# Patient Record
Sex: MALE | Race: BLACK OR AFRICAN AMERICAN | NOT HISPANIC OR LATINO | URBAN - NONMETROPOLITAN AREA
[De-identification: names, ages, dates, MRNs, and addresses within clinical notes are randomized per-mention and may not be internally consistent; named-entity substitution may affect disease eponyms.]

---

## 2024-01-24 ENCOUNTER — OFFICE VISIT (OUTPATIENT)
Dept: URBAN - NONMETROPOLITAN AREA CLINIC 2 | Facility: CLINIC | Age: 39
End: 2024-01-24
Payer: COMMERCIAL

## 2024-01-24 ENCOUNTER — LAB OUTSIDE AN ENCOUNTER (OUTPATIENT)
Dept: URBAN - NONMETROPOLITAN AREA CLINIC 2 | Facility: CLINIC | Age: 39
End: 2024-01-24

## 2024-01-24 VITALS
HEART RATE: 79 BPM | BODY MASS INDEX: 19.3 KG/M2 | WEIGHT: 166.8 LBS | SYSTOLIC BLOOD PRESSURE: 141 MMHG | DIASTOLIC BLOOD PRESSURE: 92 MMHG | HEIGHT: 78 IN

## 2024-01-24 DIAGNOSIS — K92.1 BLOODY STOOLS: ICD-10-CM

## 2024-01-24 DIAGNOSIS — R10.30 LOWER ABDOMINAL PAIN: ICD-10-CM

## 2024-01-24 DIAGNOSIS — K51.211 ULCERATIVE PROCTITIS WITH RECTAL BLEEDING: ICD-10-CM

## 2024-01-24 DIAGNOSIS — Z87.19 HISTORY OF COLITIS: ICD-10-CM

## 2024-01-24 PROBLEM — 10811000202109: Status: ACTIVE | Noted: 2024-01-24

## 2024-01-24 PROCEDURE — 99204 OFFICE O/P NEW MOD 45 MIN: CPT | Performed by: NURSE PRACTITIONER

## 2024-01-24 RX ORDER — ONDANSETRON 4 MG/1
1 TABLET ON THE TONGUE AND ALLOW TO DISSOLVE TABLET, ORALLY DISINTEGRATING ORAL ONCE A DAY
Qty: 30 | Refills: 3 | OUTPATIENT
Start: 2024-01-24

## 2024-01-24 RX ORDER — HYOSCYAMINE SULFATE 0.12 MG/1
TABLET ORAL
Qty: 20 TABLET | Status: ACTIVE | COMMUNITY

## 2024-01-24 RX ORDER — ONDANSETRON 4 MG/1
TABLET, ORALLY DISINTEGRATING ORAL
Qty: 12 TABLET | Status: ACTIVE | COMMUNITY

## 2024-01-24 RX ORDER — HYOSCYAMINE SULFATE 0.12 MG/1
1 TABLET UNDER THE TONGUE AND ALLOW TO DISSOLVE  AS NEEDED TABLET SUBLINGUAL THREE TIMES A DAY
Qty: 90 | Refills: 1 | OUTPATIENT
Start: 2024-01-24 | End: 2024-03-24

## 2024-01-24 RX ORDER — LEVETIRACETAM 500 MG/1
TAKE 1 TABLET BY MOUTH EVERY 12 HOURS TABLET, FILM COATED ORAL
Qty: 60 EACH | Refills: 2 | Status: ACTIVE | COMMUNITY

## 2024-01-24 NOTE — HPI-TODAY'S VISIT:
Mr. Garry Zarate is a 37 yo male who presents today for hospital f/u.  Patient went to [facility] ER on 1/18 with complaints of lower abd pain and bloody stools for 6 mos, bilateral leg pain and swelling x 2 days, and fever. Garry reported BRBPR TID x 6 mos, with LE weakness and fever causing him greater concern and subsequent presentation to ER. Labs notable for WBC 11.9, H/H 11.4/35.5. CT obtained showing thickening of the bowel wall in pelvis consistent with enteritis or colitis. He was discharged with Omnicef and Flagyl. Today Garry reports 6 days into his abx feeling very nauseous and not wanting to eat foods. Abdominal pain has improved but bleeding persists. No family hx of colon cancer or IBD. Reports a previous colonoscopy around 10 years ago with concerns of a mass that he states was determined to be benign, with no further follow-up. LG.

## 2024-01-25 LAB
A/G RATIO: 0.7
ALBUMIN: 4.3
ALKALINE PHOSPHATASE: 70
ALT (SGPT): 16
AST (SGOT): 29
BASO (ABSOLUTE): 0
BASOS: 0
BILIRUBIN, TOTAL: 0.3
BUN/CREATININE RATIO: 9
BUN: 9
C-REACTIVE PROTEIN, QUANT: 8
CALCIUM: 9.2
CARBON DIOXIDE, TOTAL: 18
CHLORIDE: 96
CREATININE: 1.01
EGFR: 97
EOS (ABSOLUTE): 0.1
EOS: 2
GLOBULIN, TOTAL: 5.8
GLUCOSE: 96
HEMATOCRIT: 39.6
HEMATOLOGY COMMENTS:: (no result)
HEMOGLOBIN: 12.9
IMMATURE CELLS: (no result)
IMMATURE GRANS (ABS): 0
IMMATURE GRANULOCYTES: 0
IRON BIND.CAP.(TIBC): 238
IRON SATURATION: 13
IRON: 30
LYMPHS (ABSOLUTE): 2.7
LYMPHS: 40
MCH: 26.6
MCHC: 32.6
MCV: 82
MONOCYTES(ABSOLUTE): 0.7
MONOCYTES: 11
NEUTROPHILS (ABSOLUTE): 3.2
NEUTROPHILS: 47
NRBC: (no result)
PLATELETS: 319
POTASSIUM: 4
PROTEIN, TOTAL: 10.1
RBC: 4.85
RDW: 13
SEDIMENTATION RATE-WESTERGREN: >119
SODIUM: 132
UIBC: 208
WBC: 6.8

## 2024-01-26 ENCOUNTER — TELEPHONE ENCOUNTER (OUTPATIENT)
Dept: URBAN - NONMETROPOLITAN AREA CLINIC 2 | Facility: CLINIC | Age: 39
End: 2024-01-26

## 2024-01-30 ENCOUNTER — TELEPHONE ENCOUNTER (OUTPATIENT)
Dept: URBAN - NONMETROPOLITAN AREA CLINIC 2 | Facility: CLINIC | Age: 39
End: 2024-01-30

## 2024-04-16 ENCOUNTER — COLON (OUTPATIENT)
Dept: URBAN - NONMETROPOLITAN AREA SURGERY CENTER 1 | Facility: SURGERY CENTER | Age: 39
End: 2024-04-16
Payer: COMMERCIAL

## 2024-04-16 DIAGNOSIS — K62.89 OTHER SPECIFIED DISEASES OF ANUS AND RECTUM: ICD-10-CM

## 2024-04-16 DIAGNOSIS — K62.5 ANAL BLEEDING: ICD-10-CM

## 2024-04-16 DIAGNOSIS — R19.7 ACUTE DIARRHEA: ICD-10-CM

## 2024-04-16 DIAGNOSIS — D49.0 NEOPLASM OF UNSPECIFIED BEHAVIOR OF DIGESTIVE SYSTEM: ICD-10-CM

## 2024-04-16 DIAGNOSIS — K62.6 ANAL ULCER: ICD-10-CM

## 2024-04-16 PROCEDURE — 45380 COLONOSCOPY AND BIOPSY: CPT | Performed by: INTERNAL MEDICINE

## 2024-04-16 RX ORDER — ONDANSETRON 4 MG/1
1 TABLET ON THE TONGUE AND ALLOW TO DISSOLVE TABLET, ORALLY DISINTEGRATING ORAL ONCE A DAY
Qty: 30 | Refills: 3 | Status: ACTIVE | COMMUNITY
Start: 2024-01-24

## 2024-04-16 RX ORDER — HYOSCYAMINE SULFATE 0.12 MG/1
TABLET ORAL
Qty: 20 TABLET | Status: ACTIVE | COMMUNITY

## 2024-04-16 RX ORDER — ONDANSETRON 4 MG/1
TABLET, ORALLY DISINTEGRATING ORAL
Qty: 12 TABLET | Status: ACTIVE | COMMUNITY

## 2024-04-16 RX ORDER — LEVETIRACETAM 500 MG/1
TAKE 1 TABLET BY MOUTH EVERY 12 HOURS TABLET, FILM COATED ORAL
Qty: 60 EACH | Refills: 2 | Status: ACTIVE | COMMUNITY

## 2024-05-01 ENCOUNTER — TELEPHONE ENCOUNTER (OUTPATIENT)
Dept: URBAN - NONMETROPOLITAN AREA CLINIC 2 | Facility: CLINIC | Age: 39
End: 2024-05-01

## 2024-05-08 ENCOUNTER — TELEPHONE ENCOUNTER (OUTPATIENT)
Dept: URBAN - NONMETROPOLITAN AREA CLINIC 2 | Facility: CLINIC | Age: 39
End: 2024-05-08

## 2024-05-08 ENCOUNTER — OFFICE VISIT (OUTPATIENT)
Dept: URBAN - NONMETROPOLITAN AREA CLINIC 2 | Facility: CLINIC | Age: 39
End: 2024-05-08
Payer: COMMERCIAL

## 2024-05-08 VITALS
HEART RATE: 80 BPM | BODY MASS INDEX: 19.83 KG/M2 | HEIGHT: 78 IN | DIASTOLIC BLOOD PRESSURE: 93 MMHG | SYSTOLIC BLOOD PRESSURE: 148 MMHG | WEIGHT: 171.4 LBS

## 2024-05-08 DIAGNOSIS — K51.211 ULCERATIVE PROCTITIS WITH RECTAL BLEEDING: ICD-10-CM

## 2024-05-08 PROCEDURE — 99214 OFFICE O/P EST MOD 30 MIN: CPT | Performed by: INTERNAL MEDICINE

## 2024-05-08 RX ORDER — LEVETIRACETAM 500 MG/1
TAKE 1 TABLET BY MOUTH EVERY 12 HOURS TABLET, FILM COATED ORAL
Qty: 60 EACH | Refills: 2 | Status: ACTIVE | COMMUNITY

## 2024-05-08 RX ORDER — ADALIMUMAB 80MG/0.8ML
160MG DAY 0 AND 80MG DAY 14 KIT SUBCUTANEOUS EVERY 2 WEEKS
Qty: 3 | Refills: 0 | OUTPATIENT
Start: 2024-05-08 | End: 2024-06-05

## 2024-05-08 RX ORDER — HYOSCYAMINE SULFATE 0.12 MG/1
TABLET ORAL
Qty: 20 TABLET | Status: ACTIVE | COMMUNITY

## 2024-05-08 RX ORDER — PREDNISONE 10 MG/1
4 TABLETS ONCE A DAY FOR 14 DAYS, 3 TABLETS ONCE A DAY FOR 7 DAYS, 2 TABLETS ONCE A DAY FOR 7 DAYS, 1 TABLET ONCE A DAY FOR 7 DAYS TABLET ORAL
Qty: 98 TABLET | Refills: 0 | Status: ACTIVE | COMMUNITY
Start: 2024-04-23 | End: 2024-05-28

## 2024-05-08 RX ORDER — ADALIMUMAB 40MG/0.4ML
40MG EVERY 2 WEEKS KIT SUBCUTANEOUS EVERY 2 WEEKS
Qty: 2 | Refills: 11 | OUTPATIENT
Start: 2024-05-08 | End: 2025-04-09

## 2024-05-08 RX ORDER — MESALAMINE 1000 MG/1
1 SUPPOSITORY AT BEDTIME SUPPOSITORY RECTAL ONCE A DAY
Qty: 30 | Refills: 5 | Status: ACTIVE | COMMUNITY
Start: 2024-04-23 | End: 2024-10-20

## 2024-05-08 RX ORDER — PREDNISONE 20 MG/1
2 TABLETS TABLET ORAL ONCE A DAY
Qty: 28 TABLET | Refills: 1 | OUTPATIENT
Start: 2024-05-08 | End: 2024-06-05

## 2024-05-08 RX ORDER — ONDANSETRON 4 MG/1
1 TABLET ON THE TONGUE AND ALLOW TO DISSOLVE TABLET, ORALLY DISINTEGRATING ORAL ONCE A DAY
Qty: 30 | Refills: 3 | Status: ACTIVE | COMMUNITY
Start: 2024-01-24

## 2024-05-08 RX ORDER — ONDANSETRON 4 MG/1
TABLET, ORALLY DISINTEGRATING ORAL
Qty: 12 TABLET | Status: ACTIVE | COMMUNITY

## 2024-05-09 ENCOUNTER — TELEPHONE ENCOUNTER (OUTPATIENT)
Dept: URBAN - NONMETROPOLITAN AREA CLINIC 13 | Facility: CLINIC | Age: 39
End: 2024-05-09

## 2024-05-09 ENCOUNTER — DASHBOARD ENCOUNTERS (OUTPATIENT)
Age: 39
End: 2024-05-09

## 2024-05-09 RX ORDER — ADALIMUMAB-ADAZ 40 MG/.4ML
ONE INJECTION INJECTION, SOLUTION SUBCUTANEOUS EVERY 2 WEEKS
Qty: 2 | Refills: 11 | OUTPATIENT
Start: 2024-05-09 | End: 2025-04-10

## 2024-05-21 ENCOUNTER — TELEPHONE ENCOUNTER (OUTPATIENT)
Dept: URBAN - NONMETROPOLITAN AREA CLINIC 13 | Facility: CLINIC | Age: 39
End: 2024-05-21

## 2024-05-22 ENCOUNTER — TELEPHONE ENCOUNTER (OUTPATIENT)
Dept: URBAN - NONMETROPOLITAN AREA CLINIC 2 | Facility: CLINIC | Age: 39
End: 2024-05-22

## 2024-05-22 RX ORDER — PREDNISONE 20 MG/1
1 TABLET TABLET ORAL ONCE A DAY
Qty: 30 | Refills: 3 | OUTPATIENT
Start: 2024-05-22 | End: 2024-09-19

## 2024-05-31 ENCOUNTER — TELEPHONE ENCOUNTER (OUTPATIENT)
Dept: URBAN - NONMETROPOLITAN AREA CLINIC 2 | Facility: CLINIC | Age: 39
End: 2024-05-31

## 2024-05-31 PROBLEM — 111891008: Status: ACTIVE | Noted: 2024-05-31

## 2024-05-31 LAB
A/G RATIO: 0.8
ALBUMIN: 3.9
ALKALINE PHOSPHATASE: 77
ALT (SGPT): 13
AST (SGOT): 14
BASO (ABSOLUTE): 0
BASOS: 0
BILIRUBIN, TOTAL: 0.3
BUN/CREATININE RATIO: 10
BUN: 9
C-REACTIVE PROTEIN, QUANT: 8
CALCIUM: 9.6
CARBON DIOXIDE, TOTAL: 22
CHLORIDE: 101
CREATININE: 0.87
EGFR: 113
EOS (ABSOLUTE): 0
EOS: 0
GLOBULIN, TOTAL: 4.8
GLUCOSE: 82
HBSAG SCREEN: NEGATIVE
HEMATOCRIT: 45.7
HEMATOLOGY COMMENTS:: (no result)
HEMOGLOBIN: 14.3
HEP B CORE AB, TOT: POSITIVE
HEPATITIS B SURF AB QUANT: <3.5
IMMATURE CELLS: (no result)
IMMATURE GRANS (ABS): 0
IMMATURE GRANULOCYTES: 0
LYMPHS (ABSOLUTE): 3.3
LYMPHS: 46
MCH: 26.6
MCHC: 31.3
MCV: 85
MONOCYTES(ABSOLUTE): 0.5
MONOCYTES: 7
NEUTROPHILS (ABSOLUTE): 3.3
NEUTROPHILS: 47
NRBC: (no result)
PLATELETS: 230
POTASSIUM: 3.7
PROTEIN, TOTAL: 8.7
QUANTIFERON CRITERIA: (no result)
QUANTIFERON INCUBATION: (no result)
QUANTIFERON MITOGEN VALUE: >10
QUANTIFERON NIL VALUE: 0.54
QUANTIFERON TB1 AG VALUE: 0.41
QUANTIFERON TB2 AG VALUE: 0.35
QUANTIFERON-TB GOLD PLUS: NEGATIVE
RBC: 5.37
RDW: 13.5
SEDIMENTATION RATE-WESTERGREN: 105
SODIUM: 137
WBC: 7.1

## 2024-06-05 ENCOUNTER — OFFICE VISIT (OUTPATIENT)
Dept: URBAN - NONMETROPOLITAN AREA CLINIC 2 | Facility: CLINIC | Age: 39
End: 2024-06-05

## 2024-06-06 ENCOUNTER — TELEPHONE ENCOUNTER (OUTPATIENT)
Dept: URBAN - NONMETROPOLITAN AREA CLINIC 13 | Facility: CLINIC | Age: 39
End: 2024-06-06

## 2024-06-06 RX ORDER — ADALIMUMAB-ADAZ 40 MG/.4ML
ONE INJECTION INJECTION, SOLUTION SUBCUTANEOUS EVERY 2 WEEKS
Qty: 2 | Refills: 11
Start: 2024-05-09 | End: 2025-05-08

## 2024-06-21 ENCOUNTER — TELEPHONE ENCOUNTER (OUTPATIENT)
Dept: URBAN - NONMETROPOLITAN AREA CLINIC 2 | Facility: CLINIC | Age: 39
End: 2024-06-21

## 2024-06-27 ENCOUNTER — TELEPHONE ENCOUNTER (OUTPATIENT)
Dept: URBAN - NONMETROPOLITAN AREA CLINIC 2 | Facility: CLINIC | Age: 39
End: 2024-06-27

## 2024-06-27 PROBLEM — 61977001: Status: ACTIVE | Noted: 2024-06-27

## 2024-06-27 RX ORDER — BUDESONIDE 3 MG/1
3 CAPSULE CAPSULE ORAL ONCE A DAY
Qty: 90 CAPSULE | Refills: 3 | OUTPATIENT
Start: 2024-06-28

## 2024-06-27 RX ORDER — ADALIMUMAB-ADAZ 40 MG/.4ML
40MG INJECTION, SOLUTION SUBCUTANEOUS EVERY 2 WEEKS
Qty: 2 | Refills: 11 | OUTPATIENT
Start: 2024-06-27 | End: 2025-05-29

## 2024-06-27 RX ORDER — TENOFOVIR ALAFENAMIDE 25 MG/1
1 TABLET WITH FOOD TABLET ORAL ONCE A DAY
Qty: 30 TABLET | Refills: 11 | OUTPATIENT
Start: 2024-06-27 | End: 2025-06-22

## 2024-07-02 ENCOUNTER — TELEPHONE ENCOUNTER (OUTPATIENT)
Dept: URBAN - METROPOLITAN AREA CLINIC 80 | Facility: CLINIC | Age: 39
End: 2024-07-02

## 2024-07-02 ENCOUNTER — TELEPHONE ENCOUNTER (OUTPATIENT)
Dept: URBAN - NONMETROPOLITAN AREA CLINIC 2 | Facility: CLINIC | Age: 39
End: 2024-07-02

## 2024-07-10 ENCOUNTER — TELEPHONE ENCOUNTER (OUTPATIENT)
Dept: URBAN - NONMETROPOLITAN AREA CLINIC 2 | Facility: CLINIC | Age: 39
End: 2024-07-10

## 2024-07-12 ENCOUNTER — TELEPHONE ENCOUNTER (OUTPATIENT)
Dept: URBAN - NONMETROPOLITAN AREA CLINIC 2 | Facility: CLINIC | Age: 39
End: 2024-07-12

## 2024-07-12 ENCOUNTER — OFFICE VISIT (OUTPATIENT)
Dept: URBAN - NONMETROPOLITAN AREA CLINIC 2 | Facility: CLINIC | Age: 39
End: 2024-07-12
Payer: COMMERCIAL

## 2024-07-12 VITALS
HEIGHT: 78 IN | BODY MASS INDEX: 20.71 KG/M2 | SYSTOLIC BLOOD PRESSURE: 166 MMHG | HEART RATE: 72 BPM | DIASTOLIC BLOOD PRESSURE: 95 MMHG | WEIGHT: 179 LBS

## 2024-07-12 DIAGNOSIS — K51.211 ULCERATIVE PROCTITIS WITH RECTAL BLEEDING: ICD-10-CM

## 2024-07-12 DIAGNOSIS — K64.4 EXTERNAL HEMORRHOIDS: ICD-10-CM

## 2024-07-12 DIAGNOSIS — B18.1 CHRONIC HEPATITIS B: ICD-10-CM

## 2024-07-12 PROCEDURE — 99214 OFFICE O/P EST MOD 30 MIN: CPT | Performed by: NURSE PRACTITIONER

## 2024-07-12 RX ORDER — HYOSCYAMINE SULFATE 0.12 MG/1
TABLET ORAL
Qty: 20 TABLET | Status: ACTIVE | COMMUNITY

## 2024-07-12 RX ORDER — TENOFOVIR ALAFENAMIDE 25 MG/1
1 TABLET WITH FOOD TABLET ORAL ONCE A DAY
Qty: 30 TABLET | Refills: 11 | Status: ACTIVE | COMMUNITY
Start: 2024-06-27 | End: 2025-06-22

## 2024-07-12 RX ORDER — BUDESONIDE 3 MG/1
3 CAPSULE CAPSULE ORAL ONCE A DAY
Qty: 90 CAPSULE | Refills: 3 | Status: ACTIVE | COMMUNITY
Start: 2024-06-28

## 2024-07-12 RX ORDER — PREDNISONE 20 MG/1
1 TABLET TABLET ORAL ONCE A DAY
Qty: 30 | Refills: 3 | Status: ACTIVE | COMMUNITY
Start: 2024-05-22 | End: 2024-09-19

## 2024-07-12 RX ORDER — LIDOCAINE 50 MG/G
1 APPLICATION AS NEEDED CREAM RECTAL
Qty: 1 TUBE | Refills: 3 | OUTPATIENT
Start: 2024-07-12 | End: 2024-09-06

## 2024-07-12 RX ORDER — HYDROCORTISONE 25 MG/G
1 APPLICATION CREAM TOPICAL TWICE A DAY
Qty: 1 CANISTER | Refills: 3 | OUTPATIENT
Start: 2024-07-12 | End: 2024-09-06

## 2024-07-12 RX ORDER — ADALIMUMAB 40MG/0.4ML
40MG EVERY 2 WEEKS KIT SUBCUTANEOUS EVERY 2 WEEKS
Qty: 2 | Refills: 11 | Status: ACTIVE | COMMUNITY
Start: 2024-05-08 | End: 2025-04-09

## 2024-07-12 RX ORDER — ADALIMUMAB-ADAZ 40 MG/.4ML
40MG INJECTION, SOLUTION SUBCUTANEOUS EVERY 2 WEEKS
Qty: 2 | Refills: 11 | Status: ACTIVE | COMMUNITY
Start: 2024-06-27 | End: 2025-05-29

## 2024-07-12 RX ORDER — LEVETIRACETAM 500 MG/1
TAKE 1 TABLET BY MOUTH EVERY 12 HOURS TABLET, FILM COATED ORAL
Qty: 60 EACH | Refills: 2 | Status: ACTIVE | COMMUNITY

## 2024-07-12 RX ORDER — ADALIMUMAB-ADAZ 40 MG/.4ML
ONE INJECTION INJECTION, SOLUTION SUBCUTANEOUS EVERY 2 WEEKS
Qty: 2 | Refills: 11 | Status: ACTIVE | COMMUNITY
Start: 2024-05-09 | End: 2025-05-08

## 2024-07-12 RX ORDER — DICYCLOMINE HYDROCHLORIDE 10 MG/1
1 CAPSULES CAPSULE ORAL THREE TIMES A DAY
Qty: 90 CAPSULE | Refills: 3 | OUTPATIENT
Start: 2024-07-12 | End: 2024-11-08

## 2024-07-12 RX ORDER — ONDANSETRON 4 MG/1
1 TABLET ON THE TONGUE AND ALLOW TO DISSOLVE TABLET, ORALLY DISINTEGRATING ORAL ONCE A DAY
Qty: 30 | Refills: 3 | Status: ACTIVE | COMMUNITY
Start: 2024-01-24

## 2024-07-12 RX ORDER — MESALAMINE 1000 MG/1
1 SUPPOSITORY AT BEDTIME SUPPOSITORY RECTAL ONCE A DAY
Qty: 30 | Refills: 5 | Status: ACTIVE | COMMUNITY
Start: 2024-04-23 | End: 2024-10-20

## 2024-07-12 RX ORDER — ADALIMUMAB-ADAZ 40 MG/.4ML
40MG INJECTION, SOLUTION SUBCUTANEOUS EVERY 2 WEEKS
Qty: 2 | Refills: 11 | OUTPATIENT

## 2024-07-12 RX ORDER — ONDANSETRON 4 MG/1
TABLET, ORALLY DISINTEGRATING ORAL
Qty: 12 TABLET | Status: ACTIVE | COMMUNITY

## 2024-07-12 RX ORDER — TENOFOVIR ALAFENAMIDE 25 MG/1
1 TABLET WITH FOOD TABLET ORAL ONCE A DAY
Qty: 30 TABLET | Refills: 11 | OUTPATIENT

## 2024-07-12 NOTE — HPI-TODAY'S VISIT:
1/29/24: Mr. Garry Zarate is a 39 yo male who presents today for hospital f/u.  Patient went to [facility] ER on 1/18 with complaints of lower abd pain and bloody stools for 6 mos, bilateral leg pain and swelling x 2 days, and fever. Garry reported BRBPR TID x 6 mos, with LE weakness and fever causing him greater concern and subsequent presentation to ER. Labs notable for WBC 11.9, H/H 11.4/35.5. CT obtained showing thickening of the bowel wall in pelvis consistent with enteritis or colitis. He was discharged with Omnicef and Flagyl. Today Garry reports 6 days into his abx feeling very nauseous and not wanting to eat foods. Abdominal pain has improved but bleeding persists. No family hx of colon cancer or IBD. Reports a previous colonoscopy around 10 years ago with concerns of a mass that he states was determined to be benign, with no further follow-up. LG.  7/12/24: Mr. Zarate returns for follow-up of weight loss, abdominal pain, and rectal bleeding.  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  Since his last clinic visit, he had a colonoscopy with severe proctitis, biopsies showed severe inflammation and granulation tissue.  He was placed on prednisone and mesalamine suppositories a few weeks ago.  Fortunately, he has noticed a big difference on the steroids with less bleeding and some weight gain.  He is moving his bowels 4-5x daily and this is a big improvement.  He has been taking his mesalamine suppositories by mouth.  he is c/o weight tenesmus though and rectal pain. sb

## 2024-07-16 ENCOUNTER — TELEPHONE ENCOUNTER (OUTPATIENT)
Dept: URBAN - NONMETROPOLITAN AREA CLINIC 2 | Facility: CLINIC | Age: 39
End: 2024-07-16

## 2024-07-17 ENCOUNTER — TELEPHONE ENCOUNTER (OUTPATIENT)
Dept: URBAN - NONMETROPOLITAN AREA CLINIC 2 | Facility: CLINIC | Age: 39
End: 2024-07-17

## 2024-07-22 ENCOUNTER — TELEPHONE ENCOUNTER (OUTPATIENT)
Dept: URBAN - NONMETROPOLITAN AREA CLINIC 2 | Facility: CLINIC | Age: 39
End: 2024-07-22

## 2024-07-26 ENCOUNTER — TELEPHONE ENCOUNTER (OUTPATIENT)
Dept: URBAN - NONMETROPOLITAN AREA CLINIC 2 | Facility: CLINIC | Age: 39
End: 2024-07-26

## 2024-08-02 ENCOUNTER — OFFICE VISIT (OUTPATIENT)
Dept: URBAN - NONMETROPOLITAN AREA CLINIC 2 | Facility: CLINIC | Age: 39
End: 2024-08-02
Payer: COMMERCIAL

## 2024-08-02 VITALS — HEIGHT: 78 IN

## 2024-08-02 DIAGNOSIS — K64.4 EXTERNAL HEMORRHOIDS: ICD-10-CM

## 2024-08-02 DIAGNOSIS — K51.211 ULCERATIVE PROCTITIS WITH RECTAL BLEEDING: ICD-10-CM

## 2024-08-02 DIAGNOSIS — B18.1 CHRONIC HEPATITIS B: ICD-10-CM

## 2024-08-02 PROCEDURE — 99213 OFFICE O/P EST LOW 20 MIN: CPT | Performed by: NURSE PRACTITIONER

## 2024-08-02 RX ORDER — ADALIMUMAB-ADAZ 40 MG/.4ML
ONE INJECTION INJECTION, SOLUTION SUBCUTANEOUS EVERY 2 WEEKS
Qty: 2 | Refills: 11 | Status: ACTIVE | COMMUNITY
Start: 2024-05-09 | End: 2025-05-08

## 2024-08-02 RX ORDER — HYDROCORTISONE 25 MG/G
1 APPLICATION CREAM TOPICAL TWICE A DAY
Qty: 1 CANISTER | Refills: 3 | Status: ACTIVE | COMMUNITY
Start: 2024-07-12 | End: 2024-09-06

## 2024-08-02 RX ORDER — TENOFOVIR ALAFENAMIDE 25 MG/1
1 TABLET WITH FOOD TABLET ORAL ONCE A DAY
Qty: 30 TABLET | Refills: 11 | OUTPATIENT

## 2024-08-02 RX ORDER — LIDOCAINE 50 MG/G
1 APPLICATION AS NEEDED CREAM RECTAL
Qty: 1 TUBE | Refills: 3 | Status: ACTIVE | COMMUNITY
Start: 2024-07-12 | End: 2024-09-06

## 2024-08-02 RX ORDER — BUDESONIDE 3 MG/1
3 CAPSULE CAPSULE ORAL ONCE A DAY
Qty: 90 CAPSULE | Refills: 3 | Status: ACTIVE | COMMUNITY
Start: 2024-06-28

## 2024-08-02 RX ORDER — LIDOCAINE 50 MG/G
1 APPLICATION AS NEEDED CREAM RECTAL
Qty: 1 TUBE | Refills: 3 | OUTPATIENT

## 2024-08-02 RX ORDER — ONDANSETRON 4 MG/1
1 TABLET ON THE TONGUE AND ALLOW TO DISSOLVE TABLET, ORALLY DISINTEGRATING ORAL ONCE A DAY
Qty: 30 | Refills: 3 | Status: ACTIVE | COMMUNITY
Start: 2024-01-24

## 2024-08-02 RX ORDER — ADALIMUMAB 40MG/0.4ML
40MG EVERY 2 WEEKS KIT SUBCUTANEOUS EVERY 2 WEEKS
Qty: 2 | Refills: 11 | Status: ACTIVE | COMMUNITY
Start: 2024-05-08 | End: 2025-04-09

## 2024-08-02 RX ORDER — HYDROCORTISONE 25 MG/G
1 APPLICATION CREAM TOPICAL TWICE A DAY
Qty: 1 CANISTER | Refills: 3 | OUTPATIENT

## 2024-08-02 RX ORDER — HYOSCYAMINE SULFATE 0.12 MG/1
TABLET ORAL
Qty: 20 TABLET | Status: ACTIVE | COMMUNITY

## 2024-08-02 RX ORDER — DICYCLOMINE HYDROCHLORIDE 10 MG/1
1 CAPSULES CAPSULE ORAL THREE TIMES A DAY
Qty: 90 CAPSULE | Refills: 3 | Status: ACTIVE | COMMUNITY
Start: 2024-07-12 | End: 2024-11-08

## 2024-08-02 RX ORDER — MESALAMINE 1000 MG/1
1 SUPPOSITORY AT BEDTIME SUPPOSITORY RECTAL ONCE A DAY
Qty: 30 | Refills: 5 | Status: ACTIVE | COMMUNITY
Start: 2024-04-23 | End: 2024-10-20

## 2024-08-02 RX ORDER — ADALIMUMAB-ADAZ 40 MG/.4ML
40MG INJECTION, SOLUTION SUBCUTANEOUS EVERY 2 WEEKS
Qty: 2 | Refills: 11 | OUTPATIENT

## 2024-08-02 RX ORDER — PREDNISONE 20 MG/1
1 TABLET TABLET ORAL ONCE A DAY
Qty: 30 | Refills: 3 | Status: ACTIVE | COMMUNITY
Start: 2024-05-22 | End: 2024-09-19

## 2024-08-02 RX ORDER — ONDANSETRON 4 MG/1
TABLET, ORALLY DISINTEGRATING ORAL
Qty: 12 TABLET | Status: ACTIVE | COMMUNITY

## 2024-08-02 RX ORDER — TENOFOVIR ALAFENAMIDE 25 MG/1
1 TABLET WITH FOOD TABLET ORAL ONCE A DAY
Qty: 30 TABLET | Refills: 11 | Status: ACTIVE | COMMUNITY

## 2024-08-02 RX ORDER — ADALIMUMAB-ADAZ 40 MG/.4ML
40MG INJECTION, SOLUTION SUBCUTANEOUS EVERY 2 WEEKS
Qty: 2 | Refills: 11 | Status: ACTIVE | COMMUNITY

## 2024-08-02 RX ORDER — BUDESONIDE 3 MG/1
3 CAPSULES CAPSULE, DELAYED RELEASE PELLETS ORAL ONCE A DAY
Qty: 90 CAPSULE | Refills: 3 | OUTPATIENT
Start: 2024-08-02

## 2024-08-02 RX ORDER — LEVETIRACETAM 500 MG/1
TAKE 1 TABLET BY MOUTH EVERY 12 HOURS TABLET, FILM COATED ORAL
Qty: 60 EACH | Refills: 2 | Status: ACTIVE | COMMUNITY

## 2024-08-02 RX ORDER — DICYCLOMINE HYDROCHLORIDE 10 MG/1
1 CAPSULES CAPSULE ORAL THREE TIMES A DAY
Qty: 90 CAPSULE | Refills: 3 | OUTPATIENT

## 2024-08-02 NOTE — HPI-TODAY'S VISIT:
Garry is a pleasant 39-year-old male he returns assistance with [employer] n[employer]. Injection given with no issue.  Sb

## 2024-08-16 ENCOUNTER — OFFICE VISIT (OUTPATIENT)
Dept: URBAN - NONMETROPOLITAN AREA CLINIC 2 | Facility: CLINIC | Age: 39
End: 2024-08-16
Payer: COMMERCIAL

## 2024-08-16 VITALS
BODY MASS INDEX: 20.64 KG/M2 | DIASTOLIC BLOOD PRESSURE: 106 MMHG | HEART RATE: 68 BPM | HEIGHT: 78 IN | SYSTOLIC BLOOD PRESSURE: 178 MMHG | WEIGHT: 178.4 LBS | OXYGEN SATURATION: 99 %

## 2024-08-16 DIAGNOSIS — K51.211 ULCERATIVE PROCTITIS WITH RECTAL BLEEDING: ICD-10-CM

## 2024-08-16 DIAGNOSIS — K64.4 EXTERNAL HEMORRHOIDS: ICD-10-CM

## 2024-08-16 DIAGNOSIS — B18.1 CHRONIC HEPATITIS B: ICD-10-CM

## 2024-08-16 PROCEDURE — 99214 OFFICE O/P EST MOD 30 MIN: CPT | Performed by: NURSE PRACTITIONER

## 2024-08-16 RX ORDER — DICYCLOMINE HYDROCHLORIDE 10 MG/1
1 CAPSULES CAPSULE ORAL THREE TIMES A DAY
Qty: 90 CAPSULE | Refills: 3 | Status: ACTIVE | COMMUNITY

## 2024-08-16 RX ORDER — HYOSCYAMINE SULFATE 0.12 MG/1
TABLET ORAL
Qty: 20 TABLET | Status: ACTIVE | COMMUNITY

## 2024-08-16 RX ORDER — ADALIMUMAB-ADAZ 40 MG/.4ML
40MG INJECTION, SOLUTION SUBCUTANEOUS EVERY 2 WEEKS
Qty: 2 | Refills: 11 | OUTPATIENT

## 2024-08-16 RX ORDER — BUDESONIDE 3 MG/1
3 CAPSULES CAPSULE, DELAYED RELEASE PELLETS ORAL ONCE A DAY
Qty: 90 CAPSULE | Refills: 3 | OUTPATIENT

## 2024-08-16 RX ORDER — TENOFOVIR ALAFENAMIDE 25 MG/1
1 TABLET WITH FOOD TABLET ORAL ONCE A DAY
Qty: 30 TABLET | Refills: 11 | OUTPATIENT

## 2024-08-16 RX ORDER — LEVETIRACETAM 500 MG/1
TAKE 1 TABLET BY MOUTH EVERY 12 HOURS TABLET, FILM COATED ORAL
Qty: 60 EACH | Refills: 2 | Status: ACTIVE | COMMUNITY

## 2024-08-16 RX ORDER — ADALIMUMAB-ADAZ 40 MG/.4ML
ONE INJECTION INJECTION, SOLUTION SUBCUTANEOUS EVERY 2 WEEKS
Qty: 2 | Refills: 11 | Status: ACTIVE | COMMUNITY
Start: 2024-05-09 | End: 2025-05-08

## 2024-08-16 RX ORDER — DICYCLOMINE HYDROCHLORIDE 10 MG/1
1 CAPSULES CAPSULE ORAL THREE TIMES A DAY
Qty: 90 CAPSULE | Refills: 3 | OUTPATIENT

## 2024-08-16 RX ORDER — TENOFOVIR ALAFENAMIDE 25 MG/1
1 TABLET WITH FOOD TABLET ORAL ONCE A DAY
Qty: 30 TABLET | Refills: 11 | Status: ACTIVE | COMMUNITY

## 2024-08-16 RX ORDER — BUDESONIDE 3 MG/1
3 CAPSULES CAPSULE, DELAYED RELEASE PELLETS ORAL ONCE A DAY
Qty: 90 CAPSULE | Refills: 3 | Status: ACTIVE | COMMUNITY
Start: 2024-08-02

## 2024-08-16 RX ORDER — ADALIMUMAB 40MG/0.4ML
40MG EVERY 2 WEEKS KIT SUBCUTANEOUS EVERY 2 WEEKS
Qty: 2 | Refills: 11 | Status: ACTIVE | COMMUNITY
Start: 2024-05-08 | End: 2025-04-09

## 2024-08-16 RX ORDER — ONDANSETRON 4 MG/1
1 TABLET ON THE TONGUE AND ALLOW TO DISSOLVE TABLET, ORALLY DISINTEGRATING ORAL ONCE A DAY
Qty: 30 | Refills: 3 | Status: ACTIVE | COMMUNITY
Start: 2024-01-24

## 2024-08-16 RX ORDER — ADALIMUMAB-ADAZ 40 MG/.4ML
40MG INJECTION, SOLUTION SUBCUTANEOUS EVERY 2 WEEKS
Qty: 2 | Refills: 11 | Status: ACTIVE | COMMUNITY

## 2024-08-16 RX ORDER — LIDOCAINE 50 MG/G
1 APPLICATION AS NEEDED CREAM RECTAL
Qty: 1 TUBE | Refills: 3 | Status: ACTIVE | COMMUNITY

## 2024-08-16 RX ORDER — BUDESONIDE 3 MG/1
3 CAPSULE CAPSULE ORAL ONCE A DAY
Qty: 90 CAPSULE | Refills: 3 | Status: ACTIVE | COMMUNITY
Start: 2024-06-28

## 2024-08-16 RX ORDER — HYDROCORTISONE 25 MG/G
1 APPLICATION CREAM TOPICAL TWICE A DAY
Qty: 1 CANISTER | Refills: 3 | Status: ACTIVE | COMMUNITY

## 2024-08-16 RX ORDER — HYDROCORTISONE ACETATE 25 MG/1
1 SUPPOSITORY SUPPOSITORY RECTAL ONCE A DAY
Qty: 14 SUPPOSITORY | Refills: 2 | OUTPATIENT
Start: 2024-08-16 | End: 2024-09-27

## 2024-08-16 RX ORDER — PREDNISONE 20 MG/1
1 TABLET TABLET ORAL ONCE A DAY
Qty: 30 | Refills: 3 | Status: ACTIVE | COMMUNITY
Start: 2024-05-22 | End: 2024-09-19

## 2024-08-16 RX ORDER — ONDANSETRON 4 MG/1
TABLET, ORALLY DISINTEGRATING ORAL
Qty: 12 TABLET | Status: ACTIVE | COMMUNITY

## 2024-08-16 RX ORDER — MESALAMINE 1000 MG/1
1 SUPPOSITORY AT BEDTIME SUPPOSITORY RECTAL ONCE A DAY
Qty: 30 | Refills: 5 | Status: ACTIVE | COMMUNITY
Start: 2024-04-23 | End: 2024-10-20

## 2024-08-16 NOTE — HPI-TODAY'S VISIT:
Garry is a pleasant 39-year-old male he returns assistance with [employer] njection. Injection given with no issue.  Sb

## 2024-08-21 ENCOUNTER — OFFICE VISIT (OUTPATIENT)
Dept: URBAN - NONMETROPOLITAN AREA CLINIC 2 | Facility: CLINIC | Age: 39
End: 2024-08-21

## 2024-08-21 RX ORDER — TENOFOVIR ALAFENAMIDE 25 MG/1
1 TABLET WITH FOOD TABLET ORAL ONCE A DAY
Qty: 30 TABLET | Refills: 11 | Status: ACTIVE | COMMUNITY

## 2024-08-21 RX ORDER — HYOSCYAMINE SULFATE 0.12 MG/1
TABLET ORAL
Qty: 20 TABLET | Status: ACTIVE | COMMUNITY

## 2024-08-21 RX ORDER — BUDESONIDE 3 MG/1
3 CAPSULES CAPSULE, DELAYED RELEASE PELLETS ORAL ONCE A DAY
Qty: 90 CAPSULE | Refills: 3 | Status: ACTIVE | COMMUNITY

## 2024-08-21 RX ORDER — HYDROCORTISONE 25 MG/G
1 APPLICATION CREAM TOPICAL TWICE A DAY
Qty: 1 CANISTER | Refills: 3 | Status: ACTIVE | COMMUNITY

## 2024-08-21 RX ORDER — ADALIMUMAB 40MG/0.4ML
40MG EVERY 2 WEEKS KIT SUBCUTANEOUS EVERY 2 WEEKS
Qty: 2 | Refills: 11 | Status: ACTIVE | COMMUNITY
Start: 2024-05-08 | End: 2025-04-09

## 2024-08-21 RX ORDER — DICYCLOMINE HYDROCHLORIDE 10 MG/1
1 CAPSULES CAPSULE ORAL THREE TIMES A DAY
Qty: 90 CAPSULE | Refills: 3 | Status: ACTIVE | COMMUNITY

## 2024-08-21 RX ORDER — ONDANSETRON 4 MG/1
1 TABLET ON THE TONGUE AND ALLOW TO DISSOLVE TABLET, ORALLY DISINTEGRATING ORAL ONCE A DAY
Qty: 30 | Refills: 3 | Status: ACTIVE | COMMUNITY
Start: 2024-01-24

## 2024-08-21 RX ORDER — LIDOCAINE 50 MG/G
1 APPLICATION AS NEEDED CREAM RECTAL
Qty: 1 TUBE | Refills: 3 | Status: ACTIVE | COMMUNITY

## 2024-08-21 RX ORDER — HYDROCORTISONE ACETATE 25 MG/1
1 SUPPOSITORY SUPPOSITORY RECTAL ONCE A DAY
Qty: 14 SUPPOSITORY | Refills: 2 | OUTPATIENT

## 2024-08-21 RX ORDER — PREDNISONE 20 MG/1
1 TABLET TABLET ORAL ONCE A DAY
Qty: 30 | Refills: 3 | Status: ACTIVE | COMMUNITY
Start: 2024-05-22 | End: 2024-09-19

## 2024-08-21 RX ORDER — TENOFOVIR ALAFENAMIDE 25 MG/1
1 TABLET WITH FOOD TABLET ORAL ONCE A DAY
Qty: 30 TABLET | Refills: 11 | OUTPATIENT

## 2024-08-21 RX ORDER — ADALIMUMAB-ADAZ 40 MG/.4ML
ONE INJECTION INJECTION, SOLUTION SUBCUTANEOUS EVERY 2 WEEKS
Qty: 2 | Refills: 11 | Status: ACTIVE | COMMUNITY
Start: 2024-05-09 | End: 2025-05-08

## 2024-08-21 RX ORDER — HYDROCORTISONE ACETATE 25 MG/1
1 SUPPOSITORY SUPPOSITORY RECTAL ONCE A DAY
Qty: 14 SUPPOSITORY | Refills: 2 | Status: ACTIVE | COMMUNITY
Start: 2024-08-16 | End: 2024-09-27

## 2024-08-21 RX ORDER — DICYCLOMINE HYDROCHLORIDE 10 MG/1
1 CAPSULES CAPSULE ORAL THREE TIMES A DAY
Qty: 90 CAPSULE | Refills: 3 | OUTPATIENT

## 2024-08-21 RX ORDER — ADALIMUMAB-ADAZ 40 MG/.4ML
40MG INJECTION, SOLUTION SUBCUTANEOUS EVERY 2 WEEKS
Qty: 2 | Refills: 11 | Status: ACTIVE | COMMUNITY

## 2024-08-21 RX ORDER — MESALAMINE 1000 MG/1
1 SUPPOSITORY AT BEDTIME SUPPOSITORY RECTAL ONCE A DAY
Qty: 30 | Refills: 5 | Status: ACTIVE | COMMUNITY
Start: 2024-04-23 | End: 2024-10-20

## 2024-08-21 RX ORDER — BUDESONIDE 3 MG/1
3 CAPSULES CAPSULE, DELAYED RELEASE PELLETS ORAL ONCE A DAY
Qty: 90 CAPSULE | Refills: 3 | OUTPATIENT

## 2024-08-21 RX ORDER — ADALIMUMAB-ADAZ 40 MG/.4ML
40MG INJECTION, SOLUTION SUBCUTANEOUS EVERY 2 WEEKS
Qty: 2 | Refills: 11 | OUTPATIENT

## 2024-08-21 RX ORDER — ONDANSETRON 4 MG/1
TABLET, ORALLY DISINTEGRATING ORAL
Qty: 12 TABLET | Status: ACTIVE | COMMUNITY

## 2024-08-21 RX ORDER — BUDESONIDE 3 MG/1
3 CAPSULE CAPSULE ORAL ONCE A DAY
Qty: 90 CAPSULE | Refills: 3 | Status: ACTIVE | COMMUNITY
Start: 2024-06-28

## 2024-08-21 RX ORDER — LEVETIRACETAM 500 MG/1
TAKE 1 TABLET BY MOUTH EVERY 12 HOURS TABLET, FILM COATED ORAL
Qty: 60 EACH | Refills: 2 | Status: ACTIVE | COMMUNITY

## 2024-08-21 NOTE — HPI-TODAY'S VISIT:
1/29/24: Mr. Garry Zarate is a 39 yo male who presents today for hospital f/u.  Patient went to [facility] ER on 1/18 with complaints of lower abd pain and bloody stools for 6 mos, bilateral leg pain and swelling x 2 days, and fever. Garry reported BRBPR TID x 6 mos, with LE weakness and fever causing him greater concern and subsequent presentation to ER. Labs notable for WBC 11.9, H/H 11.4/35.5. CT obtained showing thickening of the bowel wall in pelvis consistent with enteritis or colitis. He was discharged with Omnicef and Flagyl. Today Garry reports 6 days into his abx feeling very nauseous and not wanting to eat foods. Abdominal pain has improved but bleeding persists. No family hx of colon cancer or IBD. Reports a previous colonoscopy around 10 years ago with concerns of a mass that he states was determined to be benign, with no further follow-up. LG.  7/12/24: Mr. Zarate returns for follow-up of weight loss, abdominal pain, and rectal bleeding.  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  Since his last clinic visit, he had a colonoscopy with severe proctitis, biopsies showed severe inflammation and granulation tissue.  He was placed on prednisone and mesalamine suppositories a few weeks ago.  Fortunately, he has noticed a big difference on the steroids with less bleeding and some weight gain.  He is moving his bowels 4-5x daily and this is a big improvement.  He has been taking his mesalamine suppositories by mouth.  he is c/o weight tenesmus though and rectal pain. sb  8/21/24: Mr. Zarate returns for follow-up of weight loss, abdominal pain, and rectal bleeding.  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

## 2024-09-17 ENCOUNTER — ERX REFILL RESPONSE (OUTPATIENT)
Dept: URBAN - NONMETROPOLITAN AREA CLINIC 2 | Facility: CLINIC | Age: 39
End: 2024-09-17

## 2024-09-17 RX ORDER — BUDESONIDE 3 MG/1
3 CAPSULES CAPSULE, DELAYED RELEASE PELLETS ORAL ONCE A DAY
Qty: 90 CAPSULE | Refills: 3 | OUTPATIENT

## 2024-09-17 RX ORDER — BUDESONIDE 3 MG/1
TAKE 3 CAPSULES BY MOUTH ONCE A DAY CAPSULE, GELATIN COATED ORAL
Qty: 270 CAPSULE | Refills: 1 | OUTPATIENT

## 2024-10-04 ENCOUNTER — OFFICE VISIT (OUTPATIENT)
Dept: URBAN - NONMETROPOLITAN AREA CLINIC 2 | Facility: CLINIC | Age: 39
End: 2024-10-04
Payer: COMMERCIAL

## 2024-10-04 VITALS
DIASTOLIC BLOOD PRESSURE: 106 MMHG | HEIGHT: 78 IN | SYSTOLIC BLOOD PRESSURE: 171 MMHG | BODY MASS INDEX: 20.59 KG/M2 | WEIGHT: 178 LBS | HEART RATE: 85 BPM | TEMPERATURE: 97.1 F

## 2024-10-04 DIAGNOSIS — K51.211 ULCERATIVE PROCTITIS WITH RECTAL BLEEDING: ICD-10-CM

## 2024-10-04 DIAGNOSIS — K64.4 EXTERNAL HEMORRHOIDS: ICD-10-CM

## 2024-10-04 DIAGNOSIS — B18.1 CHRONIC HEPATITIS B: ICD-10-CM

## 2024-10-04 PROCEDURE — 99214 OFFICE O/P EST MOD 30 MIN: CPT | Performed by: INTERNAL MEDICINE

## 2024-10-04 RX ORDER — MESALAMINE 1000 MG/1
1 SUPPOSITORY AT BEDTIME SUPPOSITORY RECTAL ONCE A DAY
Qty: 30 | Refills: 5 | Status: ACTIVE | COMMUNITY
Start: 2024-04-23 | End: 2024-10-20

## 2024-10-04 RX ORDER — TENOFOVIR ALAFENAMIDE 25 MG/1
1 TABLET WITH FOOD TABLET ORAL ONCE A DAY
Qty: 30 TABLET | Refills: 11 | Status: ACTIVE | COMMUNITY

## 2024-10-04 RX ORDER — HYOSCYAMINE SULFATE 0.12 MG/1
TABLET ORAL
Qty: 20 TABLET | Status: ACTIVE | COMMUNITY

## 2024-10-04 RX ORDER — HYDROCORTISONE ACETATE 25 MG/1
1 SUPPOSITORY SUPPOSITORY RECTAL ONCE A DAY
Qty: 14 SUPPOSITORY | Refills: 2 | Status: ACTIVE | COMMUNITY

## 2024-10-04 RX ORDER — LEVETIRACETAM 500 MG/1
TAKE 1 TABLET BY MOUTH EVERY 12 HOURS TABLET, FILM COATED ORAL
Qty: 60 EACH | Refills: 2 | Status: ACTIVE | COMMUNITY

## 2024-10-04 RX ORDER — LIDOCAINE 50 MG/G
1 APPLICATION AS NEEDED CREAM RECTAL
Qty: 1 TUBE | Refills: 3 | Status: ACTIVE | COMMUNITY

## 2024-10-04 RX ORDER — TENOFOVIR ALAFENAMIDE 25 MG/1
1 TABLET WITH FOOD TABLET ORAL ONCE A DAY
Qty: 30 TABLET | Refills: 11 | OUTPATIENT

## 2024-10-04 RX ORDER — ADALIMUMAB-ADAZ 40 MG/.4ML
40MG INJECTION, SOLUTION SUBCUTANEOUS EVERY 2 WEEKS
Qty: 2 | Refills: 11 | OUTPATIENT

## 2024-10-04 RX ORDER — ADALIMUMAB 40MG/0.4ML
40MG EVERY 2 WEEKS KIT SUBCUTANEOUS EVERY 2 WEEKS
Qty: 2 | Refills: 11 | Status: ACTIVE | COMMUNITY
Start: 2024-05-08 | End: 2025-04-09

## 2024-10-04 RX ORDER — HYDROCORTISONE 25 MG/G
1 APPLICATION CREAM TOPICAL TWICE A DAY
Qty: 1 CANISTER | Refills: 3 | Status: ACTIVE | COMMUNITY

## 2024-10-04 RX ORDER — HYDROCORTISONE ACETATE 25 MG/1
1 SUPPOSITORY SUPPOSITORY RECTAL ONCE A DAY
Qty: 14 SUPPOSITORY | Refills: 2 | OUTPATIENT

## 2024-10-04 RX ORDER — ONDANSETRON 4 MG/1
1 TABLET ON THE TONGUE AND ALLOW TO DISSOLVE TABLET, ORALLY DISINTEGRATING ORAL ONCE A DAY
Qty: 30 | Refills: 3 | Status: ACTIVE | COMMUNITY
Start: 2024-01-24

## 2024-10-04 RX ORDER — ADALIMUMAB-ADAZ 40 MG/.4ML
ONE INJECTION INJECTION, SOLUTION SUBCUTANEOUS EVERY 2 WEEKS
Qty: 2 | Refills: 11 | Status: ACTIVE | COMMUNITY
Start: 2024-05-09 | End: 2025-05-08

## 2024-10-04 RX ORDER — ADALIMUMAB-ADAZ 40 MG/.4ML
40MG INJECTION, SOLUTION SUBCUTANEOUS EVERY 2 WEEKS
Qty: 2 | Refills: 11 | Status: ACTIVE | COMMUNITY

## 2024-10-04 RX ORDER — ONDANSETRON 4 MG/1
TABLET, ORALLY DISINTEGRATING ORAL
Qty: 12 TABLET | Status: ACTIVE | COMMUNITY

## 2024-10-04 RX ORDER — DICYCLOMINE HYDROCHLORIDE 10 MG/1
1 CAPSULES CAPSULE ORAL THREE TIMES A DAY
Qty: 90 CAPSULE | Refills: 3 | Status: ACTIVE | COMMUNITY

## 2024-10-04 RX ORDER — DICYCLOMINE HYDROCHLORIDE 10 MG/1
1 CAPSULES CAPSULE ORAL THREE TIMES A DAY
Qty: 90 CAPSULE | Refills: 3 | OUTPATIENT

## 2024-10-04 RX ORDER — BUDESONIDE 3 MG/1
TAKE 3 CAPSULES BY MOUTH ONCE A DAY CAPSULE, GELATIN COATED ORAL
Qty: 270 CAPSULE | Refills: 1 | Status: ACTIVE | COMMUNITY

## 2024-10-04 NOTE — HPI-TODAY'S VISIT:
1/29/24: Mr. Garry Zarate is a 37 yo male who presents today for hospital f/u.  Patient went to [facility] ER on 1/18 with complaints of lower abd pain and bloody stools for 6 mos, bilateral leg pain and swelling x 2 days, and fever. Garry reported BRBPR TID x 6 mos, with LE weakness and fever causing him greater concern and subsequent presentation to ER. Labs notable for WBC 11.9, H/H 11.4/35.5. CT obtained showing thickening of the bowel wall in pelvis consistent with enteritis or colitis. He was discharged with Omnicef and Flagyl. Today Garry reports 6 days into his abx feeling very nauseous and not wanting to eat foods. Abdominal pain has improved but bleeding persists. No family hx of colon cancer or IBD. Reports a previous colonoscopy around 10 years ago with concerns of a mass that he states was determined to be benign, with no further follow-up. LG.  7/12/24: Mr. Zarate returns for follow-up of weight loss, abdominal pain, and rectal bleeding.  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  Since his last clinic visit, he had a colonoscopy with severe proctitis, biopsies showed severe inflammation and granulation tissue.  He was placed on prednisone and mesalamine suppositories a few weeks ago.  Fortunately, he has noticed a big difference on the steroids with less bleeding and some weight gain.  He is moving his bowels 4-5x daily and this is a big improvement.  He has been taking his mesalamine suppositories by mouth.  he is c/o weight tenesmus though and rectal pain. sb  8/16/24: Visited with Shira, continued Vemlidy for low viral load HBV, continued Hyromoz, weaned steroids.  10/4/24: Mr. Zarate returns for follow-up of weight loss, abdominal pain, and rectal bleeding.  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  He has chronic HBV with low viral load but needs anti-TNF for his proctitis.  He has been doing a lot better with his proctitis symptoms.  He has gone from moving his bowels "20+" times now to three times.  He sees blood only occasionally.  He complains of hemorrhoids and is using hydrocortisone suppositories for this.  He is also on budesonide 3 pills daily.

## 2024-10-14 ENCOUNTER — ERX REFILL RESPONSE (OUTPATIENT)
Dept: URBAN - NONMETROPOLITAN AREA CLINIC 2 | Facility: CLINIC | Age: 39
End: 2024-10-14

## 2024-10-14 RX ORDER — HYDROCORTISONE ACETATE 25 MG/1
1 SUPPOSITORY SUPPOSITORY RECTAL ONCE A DAY
Qty: 14 SUPPOSITORY | Refills: 2 | OUTPATIENT

## 2024-10-14 RX ORDER — HYDROCORTISONE ACETATE 25 MG/1
INSERT 1 SUPPOSITORY RECTALLY ONCE DAILY FOR 14 DAYS SUPPOSITORY RECTAL
Qty: 14 SUPPOSITORY | Refills: 2 | OUTPATIENT

## 2024-10-22 ENCOUNTER — TELEPHONE ENCOUNTER (OUTPATIENT)
Dept: URBAN - NONMETROPOLITAN AREA CLINIC 2 | Facility: CLINIC | Age: 39
End: 2024-10-22

## 2024-11-06 ENCOUNTER — TELEPHONE ENCOUNTER (OUTPATIENT)
Dept: URBAN - NONMETROPOLITAN AREA CLINIC 2 | Facility: CLINIC | Age: 39
End: 2024-11-06

## 2024-11-06 RX ORDER — HYDROCORTISONE 25 MG/G
1 APPLICATION CREAM TOPICAL TWICE A DAY
Qty: 1 CANISTER | Refills: 3
End: 2025-01-01

## 2024-11-08 ENCOUNTER — TELEPHONE ENCOUNTER (OUTPATIENT)
Dept: URBAN - NONMETROPOLITAN AREA CLINIC 2 | Facility: CLINIC | Age: 39
End: 2024-11-08

## 2025-01-10 ENCOUNTER — OFFICE VISIT (OUTPATIENT)
Dept: URBAN - METROPOLITAN AREA TELEHEALTH 2 | Facility: TELEHEALTH | Age: 40
End: 2025-01-10
Payer: COMMERCIAL

## 2025-01-10 ENCOUNTER — TELEPHONE ENCOUNTER (OUTPATIENT)
Dept: URBAN - NONMETROPOLITAN AREA CLINIC 2 | Facility: CLINIC | Age: 40
End: 2025-01-10

## 2025-01-10 DIAGNOSIS — B18.1 CHRONIC HEPATITIS B: ICD-10-CM

## 2025-01-10 DIAGNOSIS — K51.211 ULCERATIVE PROCTITIS WITH RECTAL BLEEDING: ICD-10-CM

## 2025-01-10 DIAGNOSIS — K64.4 EXTERNAL HEMORRHOIDS: ICD-10-CM

## 2025-01-10 PROCEDURE — 98012 SYNCH AUDIO-ONLY EST SF 10: CPT | Performed by: NURSE PRACTITIONER

## 2025-01-10 PROCEDURE — 99442 PHONE E/M BY PHYS 11-20 MIN: CPT | Performed by: NURSE PRACTITIONER

## 2025-01-10 PROCEDURE — 99212 OFFICE O/P EST SF 10 MIN: CPT | Performed by: NURSE PRACTITIONER

## 2025-01-10 RX ORDER — ONDANSETRON 4 MG/1
TABLET, ORALLY DISINTEGRATING ORAL
Qty: 12 TABLET | Status: ACTIVE | COMMUNITY

## 2025-01-10 RX ORDER — ADALIMUMAB-ADAZ 40 MG/.4ML
ONE INJECTION INJECTION, SOLUTION SUBCUTANEOUS EVERY 2 WEEKS
Qty: 2 | Refills: 11 | Status: ACTIVE | COMMUNITY
Start: 2024-05-09 | End: 2025-05-08

## 2025-01-10 RX ORDER — BUDESONIDE 3 MG/1
TAKE 3 CAPSULES BY MOUTH ONCE A DAY CAPSULE, GELATIN COATED ORAL
Qty: 270 CAPSULE | Refills: 1 | Status: ACTIVE | COMMUNITY

## 2025-01-10 RX ORDER — ADALIMUMAB-ADAZ 80 MG/.8ML
160MG DAY 1 AND 80MG DAY 15 INJECTION, SOLUTION SUBCUTANEOUS EVERY 2 WEEKS
Qty: 3 | Refills: 0 | OUTPATIENT

## 2025-01-10 RX ORDER — ADALIMUMAB 40MG/0.4ML
40MG EVERY 2 WEEKS KIT SUBCUTANEOUS EVERY 2 WEEKS
Qty: 2 | Refills: 11 | Status: ACTIVE | COMMUNITY
Start: 2024-05-08 | End: 2025-04-09

## 2025-01-10 RX ORDER — LIDOCAINE 50 MG/G
1 APPLICATION AS NEEDED CREAM RECTAL
Qty: 1 TUBE | Refills: 3 | Status: ACTIVE | COMMUNITY

## 2025-01-10 RX ORDER — LEVETIRACETAM 500 MG/1
TAKE 1 TABLET BY MOUTH EVERY 12 HOURS TABLET, FILM COATED ORAL
Qty: 60 EACH | Refills: 2 | Status: ACTIVE | COMMUNITY

## 2025-01-10 RX ORDER — TENOFOVIR ALAFENAMIDE 25 MG/1
1 TABLET WITH FOOD TABLET ORAL ONCE A DAY
Qty: 30 TABLET | Refills: 11 | Status: ACTIVE | COMMUNITY

## 2025-01-10 RX ORDER — DICYCLOMINE HYDROCHLORIDE 10 MG/1
1 CAPSULES CAPSULE ORAL THREE TIMES A DAY
Qty: 90 CAPSULE | Refills: 3 | Status: ACTIVE | COMMUNITY

## 2025-01-10 RX ORDER — HYDROCORTISONE ACETATE 25 MG/1
INSERT 1 SUPPOSITORY RECTALLY ONCE DAILY FOR 14 DAYS SUPPOSITORY RECTAL
Qty: 14 SUPPOSITORY | Refills: 2 | Status: ACTIVE | COMMUNITY

## 2025-01-10 RX ORDER — HYOSCYAMINE SULFATE 0.12 MG/1
TABLET ORAL
Qty: 20 TABLET | Status: ACTIVE | COMMUNITY

## 2025-01-10 RX ORDER — ADALIMUMAB-ADAZ 80 MG/.8ML
160MG DAY 1 AND 80MG DAY 15 INJECTION, SOLUTION SUBCUTANEOUS EVERY 2 WEEKS
Qty: 3 | Refills: 0 | Status: ACTIVE | COMMUNITY

## 2025-01-10 RX ORDER — ADALIMUMAB-ADAZ 40 MG/.4ML
40MG INJECTION, SOLUTION SUBCUTANEOUS EVERY 2 WEEKS
Qty: 2 | Refills: 11 | Status: ACTIVE | COMMUNITY

## 2025-01-10 RX ORDER — ADALIMUMAB-ADAZ 40 MG/.4ML
40MG INJECTION, SOLUTION SUBCUTANEOUS EVERY 2 WEEKS
Qty: 2 | Refills: 11 | OUTPATIENT

## 2025-01-10 RX ORDER — ONDANSETRON 4 MG/1
1 TABLET ON THE TONGUE AND ALLOW TO DISSOLVE TABLET, ORALLY DISINTEGRATING ORAL ONCE A DAY
Qty: 30 | Refills: 3 | Status: ACTIVE | COMMUNITY
Start: 2024-01-24

## 2025-01-10 NOTE — HPI-TODAY'S VISIT:
1/29/24: Mr. Garry Zarate is a 37 yo male who presents today for hospital f/u.  Patient went to [facility] ER on 1/18 with complaints of lower abd pain and bloody stools for 6 mos, bilateral leg pain and swelling x 2 days, and fever. Garry reported BRBPR TID x 6 mos, with LE weakness and fever causing him greater concern and subsequent presentation to ER. Labs notable for WBC 11.9, H/H 11.4/35.5. CT obtained showing thickening of the bowel wall in pelvis consistent with enteritis or colitis. He was discharged with Omnicef and Flagyl. Today Garry reports 6 days into his abx feeling very nauseous and not wanting to eat foods. Abdominal pain has improved but bleeding persists. No family hx of colon cancer or IBD. Reports a previous colonoscopy around 10 years ago with concerns of a mass that he states was determined to be benign, with no further follow-up. LG.  7/12/24: Mr. Zarate returns for follow-up of weight loss, abdominal pain, and rectal bleeding.  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  Since his last clinic visit, he had a colonoscopy with severe proctitis, biopsies showed severe inflammation and granulation tissue.  He was placed on prednisone and mesalamine suppositories a few weeks ago.  Fortunately, he has noticed a big difference on the steroids with less bleeding and some weight gain.  He is moving his bowels 4-5x daily and this is a big improvement.  He has been taking his mesalamine suppositories by mouth.  he is c/o weight tenesmus though and rectal pain. sb  8/16/24: Visited with Shira, continued Vemlidy for low viral load HBV, continued Hyromoz, weaned steroids.  10/4/24: Mr. Zarate returns for follow-up of weight loss, abdominal pain, and rectal bleeding.  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  He has chronic HBV with low viral load but needs anti-TNF for his proctitis.  He has been doing a lot better with his proctitis symptoms.  He has gone from moving his bowels "20+" times now to three times.  He sees blood only occasionally.  He complains of hemorrhoids and is using hydrocortisone suppositories for this.  He is also on budesonide 3 pills daily.  1/10/25 Mr Zarate returns for f/u of proctitis and HBV. Currently, he is doing very well. bowels are moving QD. denies pain/bleeding. His only complaint is he feels vemlidy is causing tiching as this started after he started medication. sb

## 2025-01-24 ENCOUNTER — TELEPHONE ENCOUNTER (OUTPATIENT)
Dept: URBAN - NONMETROPOLITAN AREA CLINIC 2 | Facility: CLINIC | Age: 40
End: 2025-01-24

## 2025-02-28 ENCOUNTER — TELEPHONE ENCOUNTER (OUTPATIENT)
Dept: URBAN - NONMETROPOLITAN AREA CLINIC 2 | Facility: CLINIC | Age: 40
End: 2025-02-28

## 2025-02-28 RX ORDER — HYDROCORTISONE 25 MG/G
1 APPLICATION CREAM TOPICAL TWICE A DAY
Qty: 1 CANISTER | Refills: 3 | OUTPATIENT
Start: 2025-02-28 | End: 2025-04-25

## 2025-04-11 ENCOUNTER — OFFICE VISIT (OUTPATIENT)
Dept: URBAN - NONMETROPOLITAN AREA CLINIC 2 | Facility: CLINIC | Age: 40
End: 2025-04-11